# Patient Record
Sex: MALE | Race: WHITE
[De-identification: names, ages, dates, MRNs, and addresses within clinical notes are randomized per-mention and may not be internally consistent; named-entity substitution may affect disease eponyms.]

---

## 2018-09-09 ENCOUNTER — HOSPITAL ENCOUNTER (EMERGENCY)
Dept: HOSPITAL 58 - ED | Age: 33
Discharge: HOME | End: 2018-09-09

## 2018-09-09 VITALS — SYSTOLIC BLOOD PRESSURE: 154 MMHG | TEMPERATURE: 98.4 F | DIASTOLIC BLOOD PRESSURE: 99 MMHG

## 2018-09-09 VITALS — BODY MASS INDEX: 43.4 KG/M2

## 2018-09-09 DIAGNOSIS — F17.210: ICD-10-CM

## 2018-09-09 DIAGNOSIS — N20.1: Primary | ICD-10-CM

## 2018-09-09 PROCEDURE — 85025 COMPLETE CBC W/AUTO DIFF WBC: CPT

## 2018-09-09 PROCEDURE — 81001 URINALYSIS AUTO W/SCOPE: CPT

## 2018-09-09 PROCEDURE — 74176 CT ABD & PELVIS W/O CONTRAST: CPT

## 2018-09-09 PROCEDURE — 80053 COMPREHEN METABOLIC PANEL: CPT

## 2018-09-09 PROCEDURE — 36415 COLL VENOUS BLD VENIPUNCTURE: CPT

## 2018-09-09 PROCEDURE — 99283 EMERGENCY DEPT VISIT LOW MDM: CPT

## 2018-09-09 NOTE — ED.PDOC
General


ED Provider: 


Dr. CAROL BAKER-ER





Chief Complaint: Back Pain


Stated Complaint: i think i have a kidney stone


Time Seen by Physician: 13:50


Mode of Arrival: Walk-In


Information Source: Patient, Family


Exam Limitations: No limitations


Primary Care Provider: 


TAI OLEA





Nursing and Triage Documentation Reviewed and Agree: Yes


Does patient meet sepsis criteria?: No


System Inflammatory Response Syndrome: Not Applicable


Sepsis Protocol: 


For patient's 13 years and over:





Temp is 96.8 and below  and greater


Pulse >90 BPM


Resp >20/minute


Acutely Altered Mental Status





Are patient's symptoms suggestive of a new infection, such as:


   -Pneumonia


   -Skin, Soft Tissue


   -Endocarditis


   -UTI


   -Bone, Joint Infection


   -Implantable Device


   -Acute Abdominal Infection


   -Wound Infection


   -Meningitis


   -Blood Stream Catheter Infection


   -Unknown








GI Complaint Exam





- Abdominal Pain Complaint/Exam


Onset: Gradual


Duration: several days


Symptoms Are: Still present


Timing: Constant


Initial Severity: Severe


Current Severity: Moderate


Location of Pain: Discrete, RUQ


Radiates To: Reports: Back, Flank


Character: Reports: Dull, Aching


Aggravating: Reports: None


Associated Signs and Symptoms: Denies: Diaphoresis, Fever, Cough, Chest pain, 

Dizziness, Back pain, Constipation, Blood in stool, Dysuria, Urinary frequency, 

Decreased urine output, Decreased appetite, Discharge, Nausea, Vomiting, 

Diarrhea, Decreased activity


Differential Diagnoses: Renal Colic, Ureteral Stone





Review of Systems





- Review Of Systems


Constitutional: Reports: No symptoms


Eyes: Reports: No symptoms


Ears, Nose, Mouth, Throat: Reports: No symptoms


Respiratory: Reports: No symptoms


Cardiac: Reports: No symptoms


GI: Reports: Abdominal pain


: Reports: Flank pain, Pain, Urgency


Musculoskeletal: Reports: No symptoms


Skin: Reports: No symptoms


Neurological: Reports: No symptoms


Endocrine: Reports: No symptoms


Hematologic/Lymphatic: Reports: No symptoms


All Other Systems: Reviewed and Negative





Past Medical History





- Past Medical History


Previously Healthy: No


Endocrine: Reports: Unknown


Cardiovascular: Reports: Unknown


Respiratory: Reports: Unknown


Hematological: Reports: Unknown


Gastrointestinal: Reports: Unknown


Genitourinary: Reports: Unknown


Neuro/Psych: Reports: Unknown


Musculoskeletal: Reports: Unknown


Cancer: Reports: Unknown





- Surgical History


General Surgical History: Reports: Unknown





- Family History


Family History: Reports: Unknown





- Social History


Smoking Status: Current every day smoker, Heavy tobacco smoker


Hx Substance Use: No


Alcohol Screening: Occasionally





Physical Exam





- Physical Exam


Appearance: Well-appearing, No pain distress, Well-nourished


Pain Distress: Moderate


Eyes: OZZY, EOMI, Conjunctiva clear


ENT: Ears normal, Nose normal, Oropharynx normal


Neck: Supple


Respiratory: Airway patent, Breath sounds clear, Breath sounds equal, 

Respirations nonlabored


Cardiovascular: RRR, Pulses normal, No rub, No murmur


GI/: Soft


Musculoskeletal: Normal strength, ROM intact, No edema, No calf tenderness


Skin: Warm, Dry, Normal color


Neurological: Sensation intact, Motor intact, Reflexes intact, Cranial nerves 

intact, Alert, Oriented


Psychiatric: Affect appropriate, Mood appropriate





Interpretation





- Radiology Interpretation


Radiology Interpretation By: Radiologist


Radiology Results: Positive


Exam Interpreted: CT Scan





Critical Care Note





- Critical Care Note


Total Time (mins): 0





Course





- Course


Hematology/Chemistry: 


 09/09/18 13:47





 09/09/18 13:47


Orders, Labs, Meds: 





Lab Review











  09/09/18 09/09/18 09/09/18





  13:47 13:47 14:05


 


WBC  14.91 H  


 


RBC  5.04  


 


Hgb  16.1  


 


Hct  46.1  


 


MCV  91.5  


 


MCH  31.9 H  


 


MCHC  34.9  


 


RDW Coeff of Anastasia  12.1  


 


Plt Count  165  


 


Immature Gran % (Auto)  0.4  


 


Neut % (Auto)  74.3  


 


Lymph % (Auto)  13.6  


 


Mono % (Auto)  8.9  


 


Eos % (Auto)  2.1  


 


Baso % (Auto)  0.7  


 


Immature Gran # (Auto)  0.1  


 


Neut # (Auto)  11.1 H  


 


Lymph # (Auto)  2.0  


 


Mono # (Auto)  1.3  


 


Eos # (Auto)  0.3  


 


Baso # (Auto)  0.1  


 


Sodium   138 


 


Potassium   4.1 


 


Chloride   104 


 


Carbon Dioxide   25 


 


Anion Gap   13.1 


 


BUN   13 


 


Creatinine   1.58 H 


 


Estimated GFR (MDRD)   51.00 


 


BUN/Creatinine Ratio   8.22 


 


Glucose   101 


 


Calcium   9.5 


 


Total Bilirubin   1.6 H 


 


AST   33 


 


ALT   47 


 


Alkaline Phosphatase   70 


 


Total Protein   7.7 


 


Albumin   4.3 


 


Globulin   3.4 


 


Albumin/Globulin Ratio   1.26 


 


Urine Color    Yellow


 


Urine Clarity    Clear


 


Urine pH    5.5


 


Ur Specific Gravity    1.025


 


Urine Protein    Negative


 


Urine Glucose (UA)    Negative


 


Urine Ketones    Negative


 


Urine Blood    Negative


 


Urine Nitrite    Negative


 


Urine Bilirubin    Negative


 


Urine Urobilinogen    0.2


 


Ur Leukocyte Esterase    Negative








Orders











 Category Date Time Status


 


 CBC W/ AUTO DIFF Stat LAB  09/09/18 13:47 Completed


 


 COMPREHENSIVE METABOLIC PANEL Stat LAB  09/09/18 13:47 Completed


 


 UA [URINALYSIS C & S IF INDICATED] Stat LAB  09/09/18 14:05 Completed


 


 CT ABD/PEL WO RENAL STONE PROT Stat RADS  09/09/18 13:55 Completed











Vital Signs: 





 











  Temp Pulse Resp BP Pulse Ox


 


 09/09/18 13:47  98.4 F  108 H  20  154/99 H  95














Departure





- Departure


Time of Disposition: 15:34


Disposition: HOME SELF-CARE


Discharge Problem: 


 Ureteral stone





Instructions:  Kidney Stones (ED)


Condition: Good


Pt referred to PMD for follow-up: Yes


IPMP verified?: No


Additional Instructions: 


norco 7.5mg q 4hrs prn pain #12---fluids--flomax 0.4mg daily #3---strain all 

urine--consider referral to see urology if stone not passed


Allergies/Adverse Reactions: 


Allergies





No Known Allergies Allergy (Unverified 09/09/18 13:53)


 








Home Medications: 


Ambulatory Orders





Cetirizine HCl [Zyrtec] 10 mg PO AS DIRECTED PRN 09/09/18 








Disposition Discussed With: Patient, Family

## 2018-09-09 NOTE — CT
EXAM:  CT abdomen pelvis without contrast 

  

HISTORY:  Flank pain, hematuria 

  

COMPARISON:  None 

  

TECHNIQUE:  CT abdomen pelvis performed without intravenous contrast.  Coronal and sagittal reformatt
ed images obtained. 

  

FINDINGS:  Lung bases clear.  No free air.  No acute abnormalities of the bones.  Mild degenerative c
hange in the spine.  Heart normal in size.  Evaluation organ parenchyma limited without contrast.  Li
kapil diffusely decreased in attenuation.  Gallbladder appears normal.  Pancreas appears normal.  Splee
n top normal in size.  Adrenals appear normal.  Aorta normal in caliber.  Prostate normal in size.  S
mall fat-containing left inguinal hernia.  Small fat-containing periumbilical hernia.  No lymphadenop
athy or ascites.  Stomach appears normal.  No dilated loops small bowel.  Appendix appears normal.  C
olon unremarkable.  No nephrolithiasis.  Mild right hydroureternephrosis and associated perinephric/p
eriureteral stranding secondary to a 3 mm obstructing calculus at the right ureterovesicular junction
.  No left hydronephrosis.  No calculi visualized in normal course of the left ureter.  Bladder decom
pressed and poorly evaluated. 

  

IMPRESSION: 

1.  Mild right hydroureternephrosis and associated perinephric/periureteral stranding secondary to a 
3 mm obstructing calculus at the right ureterovesicular junction. 

2.  Hepatic steatosis.

## 2021-02-25 ENCOUNTER — TRANSCRIBE ORDERS (OUTPATIENT)
Dept: ADMINISTRATIVE | Facility: HOSPITAL | Age: 36
End: 2021-02-25

## 2021-02-25 ENCOUNTER — HOSPITAL ENCOUNTER (OUTPATIENT)
Dept: MRI IMAGING | Facility: HOSPITAL | Age: 36
Discharge: HOME | End: 2021-02-25
Admitting: NURSE PRACTITIONER

## 2021-02-25 DIAGNOSIS — M25.562 LEFT KNEE PAIN, UNSPECIFIED CHRONICITY: Primary | ICD-10-CM
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
## 2021-02-25 DIAGNOSIS — T14.90XA INJURY: ICD-10-CM

## 2021-02-25 PROCEDURE — 73721 MRI JNT OF LWR EXTRE W/O DYE: CPT
